# Patient Record
Sex: MALE | Race: WHITE | NOT HISPANIC OR LATINO | Employment: FULL TIME | URBAN - METROPOLITAN AREA
[De-identification: names, ages, dates, MRNs, and addresses within clinical notes are randomized per-mention and may not be internally consistent; named-entity substitution may affect disease eponyms.]

---

## 2022-05-26 ENCOUNTER — HOSPITAL ENCOUNTER (EMERGENCY)
Facility: HOSPITAL | Age: 67
Discharge: HOME | End: 2022-05-27
Attending: EMERGENCY MEDICINE

## 2022-05-26 DIAGNOSIS — T14.8XXA PUNCTURE WOUND: ICD-10-CM
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
## 2022-05-26 PROCEDURE — 99284 EMERGENCY DEPT VISIT MOD MDM: CPT

## 2022-05-26 RX ORDER — ACETAMINOPHEN 325 MG/1
650 TABLET ORAL
Status: COMPLETED | OUTPATIENT
Start: 2022-05-27 | End: 2022-05-26

## 2022-05-26 RX ORDER — AMOXICILLIN AND CLAVULANATE POTASSIUM 875; 125 MG/1; MG/1
1 TABLET, FILM COATED ORAL
Status: COMPLETED | OUTPATIENT
Start: 2022-05-27 | End: 2022-05-27

## 2022-05-26 RX ORDER — IBUPROFEN 600 MG/1
600 TABLET ORAL
Status: COMPLETED | OUTPATIENT
Start: 2022-05-27 | End: 2022-05-26

## 2022-05-26 RX ORDER — AMOXICILLIN AND CLAVULANATE POTASSIUM 875; 125 MG/1; MG/1
1 TABLET, FILM COATED ORAL 2 TIMES DAILY
Qty: 20 TABLET | Refills: 0 | Status: SHIPPED | OUTPATIENT
Start: 2022-05-26 | End: 2022-06-05

## 2022-05-26 RX ADMIN — IBUPROFEN 600 MG: 600 TABLET, FILM COATED ORAL at 11:05

## 2022-05-26 RX ADMIN — ACETAMINOPHEN 650 MG: 325 TABLET ORAL at 11:05

## 2022-05-27 VITALS
DIASTOLIC BLOOD PRESSURE: 92 MMHG | WEIGHT: 200 LBS | TEMPERATURE: 98 F | OXYGEN SATURATION: 96 % | HEART RATE: 70 BPM | BODY MASS INDEX: 27.89 KG/M2 | RESPIRATION RATE: 15 BRPM | SYSTOLIC BLOOD PRESSURE: 162 MMHG

## 2022-05-27 PROCEDURE — 90715 TDAP VACCINE 7 YRS/> IM: CPT | Performed by: EMERGENCY MEDICINE

## 2022-05-27 PROCEDURE — 90471 IMMUNIZATION ADMIN: CPT | Performed by: EMERGENCY MEDICINE

## 2022-05-27 PROCEDURE — 25000003 PHARM REV CODE 250: Performed by: EMERGENCY MEDICINE

## 2022-05-27 PROCEDURE — 63600175 PHARM REV CODE 636 W HCPCS: Performed by: EMERGENCY MEDICINE

## 2022-05-27 RX ADMIN — AMOXICILLIN AND CLAVULANATE POTASSIUM 1 TABLET: 875; 125 TABLET, FILM COATED ORAL at 12:05

## 2022-05-27 RX ADMIN — TETANUS TOXOID, REDUCED DIPHTHERIA TOXOID AND ACELLULAR PERTUSSIS VACCINE, ADSORBED 0.5 ML: 5; 2.5; 8; 8; 2.5 SUSPENSION INTRAMUSCULAR at 12:05

## 2022-05-27 NOTE — ED PROVIDER NOTES
Encounter Date: 5/26/2022       History     Chief Complaint   Patient presents with    Animal Bite     States he got bite by his sons dog around 9 pm tonight. Dog does have shots. Several puncture wounds to right hand. Cleaned with water and peroxide at home. Not up to date with tetanus.      HPI     66-year-old male presents after dog bite.  Reports that otherwise acting normal.  Reports the got some pizza and dog went for the pizza patient put his hand in from the dog the dog bit his right hand 4 times.  Patient is left-hand dominant.  He reports some pressure but no pain, numbness, tingling, weakness.  No secondary injury.  Unsure of last tetanus.  Reports that he washed it out pretty good with water and then peroxide.      Review of patient's allergies indicates:  No Known Allergies  Past Medical History:   Diagnosis Date    COPD (chronic obstructive pulmonary disease)     Coronary artery disease     Hyperlipidemia     Hypertension     NSTEMI (non-ST elevated myocardial infarction) 08/23/2015    S/P angioplasty with stent      Past Surgical History:   Procedure Laterality Date    CARDIAC SURGERY       No family history on file.  Social History     Tobacco Use    Smoking status: Current Every Day Smoker     Packs/day: 1.00     Types: Cigarettes    Tobacco comment: Uses VAP, not cigarettes   Substance Use Topics    Alcohol use: Yes     Alcohol/week: 0.0 standard drinks     Comment: occasional    Drug use: No     Review of Systems     Head: No headache.    Integument: No rashes. + lesions.  Chest: No shortness of breath.  Cardiovascular: No chest pain.  Neurologic: No focal weakness.  No numbness.  Hematologic: No easy bruising.      Physical Exam     Initial Vitals [05/26/22 2322]   BP Pulse Resp Temp SpO2   (!) 183/85 73 18 98.9 °F (37.2 °C) 97 %      MAP       --         Physical Exam     Appearance: No acute distress.  HEENT: Normocephalic. Atraumatic. No conjunctival injection. EOMI. PERRL.     Neck: No JVD. Neck supple.    Chest: Non-tender. No respiratory distress  Cardiovascular: Regular rate and rhythm. +2 radial pulses bilaterally.  Abdomen: Not distended   Musculoskeletal: Good range of motion all joints. No deformities.    Neurologic: Alert and oriented x 3.  Equal strength in upper and lower extremities bilaterally. Normal sensation. No facial droop. Normal speech.    Psych:  Appropriate, conversant   Integumentary: No rashes seen.  Good turgor.  No abrasions.  No ecchymoses. Multiple puncture wounds to palmar and dorsal surface of right hand. NVI distally. No focal bony ttp.       ED Course   Procedures  Labs Reviewed - No data to display       Imaging Results          X-Ray Hand 3 View Right (Final result)  Result time 05/27/22 00:10:44    Final result by Rashawn Lu MD (05/27/22 00:10:44)                 Impression:      Soft tissue swelling over the dorsal knuckle region without foreign body or fracture.    Linear metallic foreign body projecting over the volar soft tissues of the distal right middle finger.  Correlation needed.      Electronically signed by: Rashawn Lu  Date:    05/27/2022  Time:    00:10             Narrative:    EXAMINATION:  XR HAND COMPLETE 3 VIEW RIGHT    CLINICAL HISTORY:  dog bite x 4;    TECHNIQUE:  PA, lateral, and oblique views of the right hand were performed.    COMPARISON:  None    FINDINGS:  Bones appear intact with generalized osteopenia.  There is no evidence of fracture.  Soft tissue swelling of the dorsum aspect at the metacarpal for phalangeal is joints are noted.  A small linear metallic foreign body projects over the volar aspect of the distal middle finger at the level of the distal middle phalanx.                                 Medications   Tdap (BOOSTRIX) vaccine injection 0.5 mL (0.5 mLs Intramuscular Given 5/27/22 0005)   acetaminophen tablet 650 mg (650 mg Oral Given 5/26/22 0638)   ibuprofen tablet 600 mg (600 mg Oral Given 5/26/22  5884)   amoxicillin-clavulanate 875-125mg per tablet 1 tablet (1 tablet Oral Given 5/27/22 0003)                          Clinical Impression:   Final diagnoses:  [W54.0XXA] Dog bite, initial encounter (Primary)  [T14.8XXA] Puncture wound         66-year-old male presents after dog bite. VSS, afeb. Multiple puncture wounds to palmar and dorsal surface of right hand. NVI distally. No focal bony ttp. Ordered APAP, IBU, ice to affected area. Irrigated, sterilized, dressed. Ordered tetanus. XR neg for fx / fb.     Discussed results, diagnosis, and treatment plan with pt; advised close follow-up with PCP. Reviewed strict return precautions. Pt confirms understanding and ability to comply.       ED Disposition Condition    Discharge Stable        ED Prescriptions     Medication Sig Dispense Start Date End Date Auth. Provider    amoxicillin-clavulanate 875-125mg (AUGMENTIN) 875-125 mg per tablet Take 1 tablet by mouth 2 (two) times daily. for 10 days 20 tablet 5/26/2022 6/5/2022 Arcelia Jarvis MD        Follow-up Information     Follow up With Specialties Details Why Contact Info    Ken Domínguez MD Family Medicine Schedule an appointment as soon as possible for a visit   [address] [facility]  [address]  [facility] [address]  [phone]             Arcelia Jarvis MD  05/27/22 0032

## 2022-05-27 NOTE — ED NOTES
Pt c/o dog bite to the R hand by a pit bull owned by his son. All wounds are scabbed over w/ one oozing. Pts grand-daughter says the dog is UTD on its shots. Pt has full ROM to the R hand. Pt is A & O x 3, denies SOB, fever, chills and N/V/D. No obvious respiratory distress noted. Respirations are even and unlabored. Skin is warm and dry w/ pink mucosa. VS. RHONDA x 3mm. BBS- CTA . Abd- SNT. PSM x 4 exts. Bed is locked, in the low position and locked for safety. Call bell @ BS. Will continue to monitor closely.

## 2022-05-27 NOTE — ED NOTES
R hand wounds aggressively scrubbed w/ sterile water and iodine, open wounds irrigated w/ 60cc syringe and 18g catheter. Pt tolerated well.